# Patient Record
Sex: FEMALE | Race: ASIAN | NOT HISPANIC OR LATINO | URBAN - METROPOLITAN AREA
[De-identification: names, ages, dates, MRNs, and addresses within clinical notes are randomized per-mention and may not be internally consistent; named-entity substitution may affect disease eponyms.]

---

## 2017-09-28 ENCOUNTER — INPATIENT (INPATIENT)
Facility: HOSPITAL | Age: 22
LOS: 3 days | Discharge: HOME | End: 2017-10-02
Attending: SPECIALIST | Admitting: SPECIALIST

## 2017-09-28 VITALS — WEIGHT: 154.32 LBS | HEIGHT: 60 IN

## 2017-09-28 DIAGNOSIS — O26.899 OTHER SPECIFIED PREGNANCY RELATED CONDITIONS, UNSPECIFIED TRIMESTER: ICD-10-CM

## 2017-09-28 DIAGNOSIS — Z3A.00 WEEKS OF GESTATION OF PREGNANCY NOT SPECIFIED: ICD-10-CM

## 2017-09-28 LAB
BASOPHILS # BLD AUTO: 0.05 K/UL — SIGNIFICANT CHANGE UP (ref 0–0.2)
BASOPHILS NFR BLD AUTO: 0.4 % — SIGNIFICANT CHANGE UP (ref 0–2)
BLD GP AB SCN SERPL QL: NEGATIVE — SIGNIFICANT CHANGE UP
EOSINOPHIL # BLD AUTO: 0.07 K/UL — SIGNIFICANT CHANGE UP (ref 0–0.5)
EOSINOPHIL NFR BLD AUTO: 0.5 % — SIGNIFICANT CHANGE UP (ref 0–6)
HCT VFR BLD CALC: 37.7 % — SIGNIFICANT CHANGE UP (ref 34.5–45)
HGB BLD-MCNC: 12 G/DL — SIGNIFICANT CHANGE UP (ref 11.5–15.5)
IMM GRANULOCYTES # BLD AUTO: 0.24 # — SIGNIFICANT CHANGE UP
IMM GRANULOCYTES NFR BLD AUTO: 1.8 % — HIGH (ref 0–1.5)
LYMPHOCYTES # BLD AUTO: 2.73 K/UL — SIGNIFICANT CHANGE UP (ref 1–3.3)
LYMPHOCYTES # BLD AUTO: 20.7 % — SIGNIFICANT CHANGE UP (ref 13–44)
MCHC RBC-ENTMCNC: 24.8 PG — LOW (ref 27–34)
MCHC RBC-ENTMCNC: 31.8 % — LOW (ref 32–36)
MCV RBC AUTO: 77.9 FL — LOW (ref 80–100)
MONOCYTES # BLD AUTO: 0.85 K/UL — SIGNIFICANT CHANGE UP (ref 0–0.9)
MONOCYTES NFR BLD AUTO: 6.4 % — SIGNIFICANT CHANGE UP (ref 2–14)
NEUTROPHILS # BLD AUTO: 9.27 K/UL — HIGH (ref 1.8–7.4)
NEUTROPHILS NFR BLD AUTO: 70.2 % — SIGNIFICANT CHANGE UP (ref 43–77)
NRBC # FLD: 0 — SIGNIFICANT CHANGE UP
PLATELET # BLD AUTO: 162 K/UL — SIGNIFICANT CHANGE UP (ref 150–400)
PMV BLD: 12.9 FL — SIGNIFICANT CHANGE UP (ref 7–13)
RBC # BLD: 4.84 M/UL — SIGNIFICANT CHANGE UP (ref 3.8–5.2)
RBC # FLD: 14.8 % — HIGH (ref 10.3–14.5)
RH IG SCN BLD-IMP: POSITIVE — SIGNIFICANT CHANGE UP
RH IG SCN BLD-IMP: POSITIVE — SIGNIFICANT CHANGE UP
WBC # BLD: 13.21 K/UL — HIGH (ref 3.8–10.5)
WBC # FLD AUTO: 13.21 K/UL — HIGH (ref 3.8–10.5)

## 2017-09-28 RX ORDER — AMPICILLIN TRIHYDRATE 250 MG
1 CAPSULE ORAL EVERY 4 HOURS
Qty: 0 | Refills: 0 | Status: DISCONTINUED | OUTPATIENT
Start: 2017-09-28 | End: 2017-09-29

## 2017-09-28 RX ORDER — CITRIC ACID/SODIUM CITRATE 300-500 MG
15 SOLUTION, ORAL ORAL EVERY 4 HOURS
Qty: 0 | Refills: 0 | Status: DISCONTINUED | OUTPATIENT
Start: 2017-09-28 | End: 2017-09-29

## 2017-09-28 RX ORDER — SODIUM CHLORIDE 9 MG/ML
1000 INJECTION, SOLUTION INTRAVENOUS
Qty: 0 | Refills: 0 | Status: DISCONTINUED | OUTPATIENT
Start: 2017-09-28 | End: 2017-09-29

## 2017-09-28 RX ORDER — SODIUM CHLORIDE 9 MG/ML
1000 INJECTION, SOLUTION INTRAVENOUS ONCE
Qty: 0 | Refills: 0 | Status: DISCONTINUED | OUTPATIENT
Start: 2017-09-28 | End: 2017-09-29

## 2017-09-28 RX ORDER — AMPICILLIN TRIHYDRATE 250 MG
CAPSULE ORAL
Qty: 0 | Refills: 0 | Status: DISCONTINUED | OUTPATIENT
Start: 2017-09-28 | End: 2017-09-29

## 2017-09-28 RX ORDER — AMPICILLIN TRIHYDRATE 250 MG
2 CAPSULE ORAL ONCE
Qty: 0 | Refills: 0 | Status: COMPLETED | OUTPATIENT
Start: 2017-09-28 | End: 2017-09-28

## 2017-09-28 RX ORDER — OXYTOCIN 10 UNIT/ML
333.33 VIAL (ML) INJECTION
Qty: 20 | Refills: 0 | Status: DISCONTINUED | OUTPATIENT
Start: 2017-09-28 | End: 2017-09-29

## 2017-09-28 RX ADMIN — Medication 216 GRAM(S): at 20:14

## 2017-09-28 RX ADMIN — SODIUM CHLORIDE 125 MILLILITER(S): 9 INJECTION, SOLUTION INTRAVENOUS at 20:14

## 2017-09-29 DIAGNOSIS — O26.899 OTHER SPECIFIED PREGNANCY RELATED CONDITIONS, UNSPECIFIED TRIMESTER: ICD-10-CM

## 2017-09-29 LAB — T PALLIDUM AB TITR SER: NEGATIVE — SIGNIFICANT CHANGE UP

## 2017-09-29 RX ORDER — CEFAZOLIN SODIUM 1 G
2000 VIAL (EA) INJECTION EVERY 8 HOURS
Qty: 0 | Refills: 0 | Status: DISCONTINUED | OUTPATIENT
Start: 2017-09-30 | End: 2017-10-01

## 2017-09-29 RX ORDER — NALOXONE HYDROCHLORIDE 4 MG/.1ML
0.1 SPRAY NASAL
Qty: 0 | Refills: 0 | Status: DISCONTINUED | OUTPATIENT
Start: 2017-09-30 | End: 2017-10-01

## 2017-09-29 RX ORDER — TETANUS TOXOID, REDUCED DIPHTHERIA TOXOID AND ACELLULAR PERTUSSIS VACCINE, ADSORBED 5; 2.5; 8; 8; 2.5 [IU]/.5ML; [IU]/.5ML; UG/.5ML; UG/.5ML; UG/.5ML
0.5 SUSPENSION INTRAMUSCULAR ONCE
Qty: 0 | Refills: 0 | Status: DISCONTINUED | OUTPATIENT
Start: 2017-09-30 | End: 2017-10-02

## 2017-09-29 RX ORDER — CEFAZOLIN SODIUM 1 G
VIAL (EA) INJECTION
Qty: 0 | Refills: 0 | Status: DISCONTINUED | OUTPATIENT
Start: 2017-09-29 | End: 2017-09-29

## 2017-09-29 RX ORDER — DOCUSATE SODIUM 100 MG
100 CAPSULE ORAL
Qty: 0 | Refills: 0 | Status: DISCONTINUED | OUTPATIENT
Start: 2017-09-30 | End: 2017-10-02

## 2017-09-29 RX ORDER — HYDROMORPHONE HYDROCHLORIDE 2 MG/ML
1 INJECTION INTRAMUSCULAR; INTRAVENOUS; SUBCUTANEOUS
Qty: 0 | Refills: 0 | Status: DISCONTINUED | OUTPATIENT
Start: 2017-09-30 | End: 2017-10-01

## 2017-09-29 RX ORDER — GLYCERIN ADULT
1 SUPPOSITORY, RECTAL RECTAL AT BEDTIME
Qty: 0 | Refills: 0 | Status: DISCONTINUED | OUTPATIENT
Start: 2017-09-30 | End: 2017-10-02

## 2017-09-29 RX ORDER — HEPARIN SODIUM 5000 [USP'U]/ML
5000 INJECTION INTRAVENOUS; SUBCUTANEOUS EVERY 12 HOURS
Qty: 0 | Refills: 0 | Status: DISCONTINUED | OUTPATIENT
Start: 2017-09-30 | End: 2017-10-02

## 2017-09-29 RX ORDER — BUTORPHANOL TARTRATE 2 MG/ML
2 INJECTION, SOLUTION INTRAMUSCULAR; INTRAVENOUS ONCE
Qty: 0 | Refills: 0 | Status: DISCONTINUED | OUTPATIENT
Start: 2017-09-29 | End: 2017-09-29

## 2017-09-29 RX ORDER — DIPHENHYDRAMINE HCL 50 MG
25 CAPSULE ORAL EVERY 6 HOURS
Qty: 0 | Refills: 0 | Status: DISCONTINUED | OUTPATIENT
Start: 2017-09-30 | End: 2017-10-02

## 2017-09-29 RX ORDER — OXYTOCIN 10 UNIT/ML
41.67 VIAL (ML) INJECTION
Qty: 20 | Refills: 0 | Status: DISCONTINUED | OUTPATIENT
Start: 2017-09-29 | End: 2017-09-30

## 2017-09-29 RX ORDER — SODIUM CHLORIDE 9 MG/ML
1000 INJECTION, SOLUTION INTRAVENOUS
Qty: 0 | Refills: 0 | Status: DISCONTINUED | OUTPATIENT
Start: 2017-09-30 | End: 2017-10-01

## 2017-09-29 RX ORDER — SIMETHICONE 80 MG/1
80 TABLET, CHEWABLE ORAL EVERY 4 HOURS
Qty: 0 | Refills: 0 | Status: DISCONTINUED | OUTPATIENT
Start: 2017-09-30 | End: 2017-10-02

## 2017-09-29 RX ORDER — FERROUS SULFATE 325(65) MG
325 TABLET ORAL DAILY
Qty: 0 | Refills: 0 | Status: DISCONTINUED | OUTPATIENT
Start: 2017-09-30 | End: 2017-10-02

## 2017-09-29 RX ORDER — OXYCODONE HYDROCHLORIDE 5 MG/1
5 TABLET ORAL
Qty: 0 | Refills: 0 | Status: DISCONTINUED | OUTPATIENT
Start: 2017-09-29 | End: 2017-10-01

## 2017-09-29 RX ORDER — LANOLIN
1 OINTMENT (GRAM) TOPICAL
Qty: 0 | Refills: 0 | Status: DISCONTINUED | OUTPATIENT
Start: 2017-09-30 | End: 2017-10-02

## 2017-09-29 RX ORDER — ONDANSETRON 8 MG/1
4 TABLET, FILM COATED ORAL EVERY 6 HOURS
Qty: 0 | Refills: 0 | Status: DISCONTINUED | OUTPATIENT
Start: 2017-09-30 | End: 2017-10-01

## 2017-09-29 RX ORDER — BUTORPHANOL TARTRATE 2 MG/ML
0.12 INJECTION, SOLUTION INTRAMUSCULAR; INTRAVENOUS EVERY 6 HOURS
Qty: 0 | Refills: 0 | Status: DISCONTINUED | OUTPATIENT
Start: 2017-09-30 | End: 2017-10-01

## 2017-09-29 RX ORDER — CEFAZOLIN SODIUM 1 G
2000 VIAL (EA) INJECTION ONCE
Qty: 0 | Refills: 0 | Status: COMPLETED | OUTPATIENT
Start: 2017-09-29 | End: 2017-09-29

## 2017-09-29 RX ORDER — CEFAZOLIN SODIUM 1 G
VIAL (EA) INJECTION
Qty: 0 | Refills: 0 | Status: DISCONTINUED | OUTPATIENT
Start: 2017-09-29 | End: 2017-10-01

## 2017-09-29 RX ORDER — OXYCODONE HYDROCHLORIDE 5 MG/1
10 TABLET ORAL
Qty: 0 | Refills: 0 | Status: DISCONTINUED | OUTPATIENT
Start: 2017-09-30 | End: 2017-10-01

## 2017-09-29 RX ADMIN — BUTORPHANOL TARTRATE 2 MILLIGRAM(S): 2 INJECTION, SOLUTION INTRAMUSCULAR; INTRAVENOUS at 10:57

## 2017-09-29 RX ADMIN — BUTORPHANOL TARTRATE 2 MILLIGRAM(S): 2 INJECTION, SOLUTION INTRAMUSCULAR; INTRAVENOUS at 14:31

## 2017-09-29 RX ADMIN — Medication 108 GRAM(S): at 00:07

## 2017-09-29 RX ADMIN — Medication 125 MILLIUNIT(S)/MIN: at 19:11

## 2017-09-29 RX ADMIN — Medication 108 GRAM(S): at 12:48

## 2017-09-29 RX ADMIN — BUTORPHANOL TARTRATE 2 MILLIGRAM(S): 2 INJECTION, SOLUTION INTRAMUSCULAR; INTRAVENOUS at 09:54

## 2017-09-29 RX ADMIN — Medication 108 GRAM(S): at 08:51

## 2017-09-29 RX ADMIN — Medication 204 MILLIGRAM(S): at 14:31

## 2017-09-29 RX ADMIN — Medication 108 GRAM(S): at 04:16

## 2017-09-29 RX ADMIN — Medication 204 MILLIGRAM(S): at 09:54

## 2017-09-29 RX ADMIN — OXYCODONE HYDROCHLORIDE 5 MILLIGRAM(S): 5 TABLET ORAL at 23:04

## 2017-09-29 RX ADMIN — Medication 100 MILLIGRAM(S): at 23:04

## 2017-09-29 NOTE — PROVIDER CONTACT NOTE (OTHER) - SITUATION
Patient has had multiple severe BP, 164/144 HR 96, 156/120 HR97. Patient denies HA, dizziness, NV, epigastric pain, blurry vision. pt only complains of neck pain radiating to shoulder 6/10.

## 2017-09-29 NOTE — PROVIDER CONTACT NOTE (OTHER) - ASSESSMENT
Patient has had multiple severe BP, 164/144 HR 96, 156/120 HR97. Patient denies HA, dizziness, NV, epigastric pain, blurry vision. pt only complains of neck pain radiating to shoulder 6/10. Patient declines any pain meds. Pt fundus firm, at umbilicus bleeding lightly. Patient is shivering.

## 2017-09-30 LAB
HCT VFR BLD CALC: 30.9 % — LOW (ref 34.5–45)
HGB BLD-MCNC: 10 G/DL — LOW (ref 11.5–15.5)
MCHC RBC-ENTMCNC: 25.8 PG — LOW (ref 27–34)
MCHC RBC-ENTMCNC: 32.4 % — SIGNIFICANT CHANGE UP (ref 32–36)
MCV RBC AUTO: 79.6 FL — LOW (ref 80–100)
NRBC # FLD: 0 — SIGNIFICANT CHANGE UP
PLATELET # BLD AUTO: 126 K/UL — LOW (ref 150–400)
PMV BLD: 12.6 FL — SIGNIFICANT CHANGE UP (ref 7–13)
RBC # BLD: 3.88 M/UL — SIGNIFICANT CHANGE UP (ref 3.8–5.2)
RBC # FLD: 15.1 % — HIGH (ref 10.3–14.5)
WBC # BLD: 14.89 K/UL — HIGH (ref 3.8–10.5)
WBC # FLD AUTO: 14.89 K/UL — HIGH (ref 3.8–10.5)

## 2017-09-30 RX ORDER — OXYTOCIN 10 UNIT/ML
41.67 VIAL (ML) INJECTION
Qty: 20 | Refills: 0 | Status: DISCONTINUED | OUTPATIENT
Start: 2017-09-30 | End: 2017-09-30

## 2017-09-30 RX ORDER — OXYTOCIN 10 UNIT/ML
333.33 VIAL (ML) INJECTION
Qty: 20 | Refills: 0 | Status: DISCONTINUED | OUTPATIENT
Start: 2017-09-30 | End: 2017-09-30

## 2017-09-30 RX ORDER — OXYCODONE HYDROCHLORIDE 5 MG/1
5 TABLET ORAL EVERY 4 HOURS
Qty: 0 | Refills: 0 | Status: DISCONTINUED | OUTPATIENT
Start: 2017-10-01 | End: 2017-10-02

## 2017-09-30 RX ORDER — IBUPROFEN 200 MG
600 TABLET ORAL EVERY 6 HOURS
Qty: 0 | Refills: 0 | Status: DISCONTINUED | OUTPATIENT
Start: 2017-10-01 | End: 2017-10-02

## 2017-09-30 RX ORDER — SODIUM CHLORIDE 9 MG/ML
1000 INJECTION, SOLUTION INTRAVENOUS
Qty: 0 | Refills: 0 | Status: DISCONTINUED | OUTPATIENT
Start: 2017-09-30 | End: 2017-09-30

## 2017-09-30 RX ORDER — ACETAMINOPHEN 500 MG
975 TABLET ORAL EVERY 6 HOURS
Qty: 0 | Refills: 0 | Status: DISCONTINUED | OUTPATIENT
Start: 2017-09-30 | End: 2017-10-02

## 2017-09-30 RX ORDER — HYDROMORPHONE HYDROCHLORIDE 2 MG/ML
0.5 INJECTION INTRAMUSCULAR; INTRAVENOUS; SUBCUTANEOUS
Qty: 0 | Refills: 0 | Status: DISCONTINUED | OUTPATIENT
Start: 2017-09-30 | End: 2017-09-30

## 2017-09-30 RX ORDER — KETOROLAC TROMETHAMINE 30 MG/ML
30 SYRINGE (ML) INJECTION EVERY 6 HOURS
Qty: 0 | Refills: 0 | Status: DISCONTINUED | OUTPATIENT
Start: 2017-09-30 | End: 2017-10-01

## 2017-09-30 RX ORDER — HYDROMORPHONE HYDROCHLORIDE 2 MG/ML
1 INJECTION INTRAMUSCULAR; INTRAVENOUS; SUBCUTANEOUS
Qty: 0 | Refills: 0 | Status: DISCONTINUED | OUTPATIENT
Start: 2017-09-30 | End: 2017-09-30

## 2017-09-30 RX ORDER — ONDANSETRON 8 MG/1
4 TABLET, FILM COATED ORAL ONCE
Qty: 0 | Refills: 0 | Status: DISCONTINUED | OUTPATIENT
Start: 2017-09-30 | End: 2017-09-30

## 2017-09-30 RX ORDER — OXYCODONE HYDROCHLORIDE 5 MG/1
5 TABLET ORAL
Qty: 0 | Refills: 0 | Status: DISCONTINUED | OUTPATIENT
Start: 2017-10-01 | End: 2017-10-02

## 2017-09-30 RX ADMIN — Medication 30 MILLIGRAM(S): at 09:15

## 2017-09-30 RX ADMIN — Medication 30 MILLIGRAM(S): at 00:45

## 2017-09-30 RX ADMIN — Medication 100 MILLIGRAM(S): at 05:28

## 2017-09-30 RX ADMIN — HEPARIN SODIUM 5000 UNIT(S): 5000 INJECTION INTRAVENOUS; SUBCUTANEOUS at 00:45

## 2017-09-30 RX ADMIN — Medication 975 MILLIGRAM(S): at 20:00

## 2017-09-30 RX ADMIN — Medication 30 MILLIGRAM(S): at 09:00

## 2017-09-30 RX ADMIN — Medication 975 MILLIGRAM(S): at 01:15

## 2017-09-30 RX ADMIN — Medication 100 MILLIGRAM(S): at 13:15

## 2017-09-30 RX ADMIN — Medication 975 MILLIGRAM(S): at 20:30

## 2017-09-30 RX ADMIN — Medication 30 MILLIGRAM(S): at 17:00

## 2017-09-30 RX ADMIN — HEPARIN SODIUM 5000 UNIT(S): 5000 INJECTION INTRAVENOUS; SUBCUTANEOUS at 13:30

## 2017-09-30 RX ADMIN — Medication 30 MILLIGRAM(S): at 01:15

## 2017-09-30 RX ADMIN — OXYCODONE HYDROCHLORIDE 5 MILLIGRAM(S): 5 TABLET ORAL at 00:00

## 2017-09-30 RX ADMIN — Medication 975 MILLIGRAM(S): at 00:45

## 2017-09-30 RX ADMIN — Medication 25 MILLIGRAM(S): at 02:48

## 2017-09-30 RX ADMIN — Medication 30 MILLIGRAM(S): at 17:15

## 2017-09-30 NOTE — DISCHARGE NOTE OB - PATIENT PORTAL LINK FT
“You can access the [employer] Patient Portal, offered by [facility], by registering with the following website: [email or web address]”

## 2017-09-30 NOTE — DISCHARGE NOTE OB - CARE PLAN
Principal Discharge DX:	 delivery delivered  Goal:	routine postop care  Instructions for follow-up, activity and diet:	follow up in 2 weeks/ regular diet & activity as tolerate

## 2017-09-30 NOTE — DISCHARGE NOTE OB - MATERIALS PROVIDED
Back To Sleep Handout/Shaken Baby Prevention Handout/Discharge Medication Information for Patients and Families Pocket Guide/[facility] [place] Screening Program/[place]  Immunization Record/Bottle Feeding Log/Breastfeeding Log/[facility] Hearing Screen Program/Vaccinations/Breastfeeding Mother’s Support Group Information/Guide to Postpartum Care/Breastfeeding Guide and Packet/Birth Certificate Instructions

## 2017-09-30 NOTE — PROGRESS NOTE ADULT - SUBJECTIVE AND OBJECTIVE BOX
Postpartum Note,  Section  She is a  22y woman who is now post-operative day: 1    Subjective:  The patient feels well.  She is ambulating.   She is tolerating regular diet.  She denies nausea and vomiting.  She is voiding.  Her pain is controlled.  She reports normal postpartum bleeding.    Vital Signs Last 24 Hrs  T(C): 37.1 (30 Sep 2017 05:34), Max: 38.1 (30 Sep 2017 00:02)  T(F): 98.7 (30 Sep 2017 05:34), Max: 100.6 (30 Sep 2017 00:02)  HR: 88 (30 Sep 2017 07:20) (86 - 107)  BP: 128/83 (30 Sep 2017 05:34) (84/38 - 167/125)  BP(mean): 71 (29 Sep 2017 22:30) (71 - 74)  RR: 17 (30 Sep 2017 05:34) (16 - 18)  SpO2: 100% (30 Sep 2017 05:34) (97% - 999%)    Physical exam:  Gen: NAD  Breast: Soft, nontender, not engorged.  Abdomen: Soft, nontender, no distension , firm uterine fundus at umbilicus.  Incision: Clean, dry, and intact with steri strips  Pelvic: Normal lochia noted  Ext: No calf tenderness    LABS:                        10.0   14.89 )-----------( 126      ( 30 Sep 2017 05:30 )             30.9         Allergies    No Known Allergies    Intolerances      MEDICATIONS  (STANDING):  heparin  Injectable 5000 Unit(s) SubCutaneous every 12 hours  lactated ringers. 1000 milliLiter(s) (125 mL/Hr) IV Continuous <Continuous>  diphtheria/tetanus/pertussis (acellular) Vaccine (ADAcel) 0.5 milliLiter(s) IntraMuscular once  ferrous    sulfate 325 milliGRAM(s) Oral daily  prenatal multivitamin 1 Tablet(s) Oral daily  ceFAZolin   IVPB      ceFAZolin   IVPB 2000 milliGRAM(s) IV Intermittent every 8 hours  ketorolac   Injectable 30 milliGRAM(s) IV Push every 6 hours  acetaminophen   Tablet. 975 milliGRAM(s) Oral every 6 hours    MEDICATIONS  (PRN):  oxyCODONE    IR 5 milliGRAM(s) Oral every 3 hours PRN Mild Pain  oxyCODONE    IR 10 milliGRAM(s) Oral every 3 hours PRN Moderate Pain  HYDROmorphone  Injectable 1 milliGRAM(s) IV Push every 3 hours PRN Severe Pain  naloxone Injectable 0.1 milliGRAM(s) IV Push every 3 minutes PRN For ANY of the following changes in patient status:  A. RR LESS THAN 10 breaths per minute, B. Oxygen saturation LESS THAN 90%, C. Sedation score of 6  ondansetron Injectable 4 milliGRAM(s) IV Push every 6 hours PRN Nausea  butorphanol Injectable 0.125 milliGRAM(s) IV Push every 6 hours PRN Pruritus  simethicone 80 milliGRAM(s) Chew every 4 hours PRN Gas  diphenhydrAMINE   Capsule 25 milliGRAM(s) Oral every 6 hours PRN Itching  glycerin Suppository - Adult 1 Suppository(s) Rectal at bedtime PRN Constipation  docusate sodium 100 milliGRAM(s) Oral two times a day PRN Stool Softening  lanolin Ointment 1 Application(s) Topical every 3 hours PRN Sore Nipples        Assessment and Plan  POD # 1s/p  section  Doing well.  Encourage ambulation.

## 2017-09-30 NOTE — PROGRESS NOTE ADULT - SUBJECTIVE AND OBJECTIVE BOX
OB Progress Note:  Delivery, POD#1    S: 23yo POD#1 s/p LTCS . Her pain is well controlled. She is tolerating a regular diet and passing flatus. Denies N/V. Denies CP/SOB/lightheadedness/dizziness. She is ambulating without difficulty,  Indwelling catheter is in place.    O:   Vital Signs Last 24 Hrs  T(C): 37.1 (30 Sep 2017 05:34), Max: 38.1 (30 Sep 2017 00:02)  T(F): 98.7 (30 Sep 2017 05:34), Max: 100.6 (30 Sep 2017 00:02)  HR: 88 (30 Sep 2017 07:20) (86 - 107)  BP: 128/83 (30 Sep 2017 05:34) (84/38 - 167/125)  BP(mean): 71 (29 Sep 2017 22:30) (71 - 74)  RR: 17 (30 Sep 2017 05:34) (16 - 18)  SpO2: 100% (30 Sep 2017 05:34) (97% - 999%)    Labs:  Blood type: B Positive  Rubella IgG: RPR: Negative                          10.0<L>   14.89<H> >-----------< 126<L>    (  @ 05:30 )             30.9<L>                        12.0   13.21<H> >-----------< 162    (  @ 19:35 )             37.7      PE:  General: NAD  Abdomen: Mildly distended, appropriately tender, incision c/d/i.  Extremities: No erythema, no pitting edema

## 2017-09-30 NOTE — DISCHARGE NOTE OB - MEDICATION SUMMARY - MEDICATIONS TO TAKE
I will START or STAY ON the medications listed below when I get home from the hospital:    ibuprofen 600 mg oral tablet  -- 1 tab(s) by mouth every 6 hours  -- Indication: For for pain     oxyCODONE 5 mg oral tablet  -- 1 tab(s) by mouth every 4 hours, As needed, Severe Pain (7 - 10)  -- Indication: For for moderate pain

## 2017-09-30 NOTE — PROGRESS NOTE ADULT - PROBLEM SELECTOR PLAN 1
- Continue regular diet.  - Increase ambulation.  - Continue motrin, tylenol, oxycodone PRN for pain control.  - Discontinue salmeron catheter at 24 hours post-op   - F/u AM CBC    Donovan Anglin PGY-1

## 2017-09-30 NOTE — CHART NOTE - NSCHARTNOTEFT_GEN_A_CORE
Called by RN due to patient having temperature of 100.6 F on admission.  Patient currently on Invanz X 3 doses for prophalaxis.      S: Patient doing well.   Pain controlled.   Patient has not started to ambulating.  Patient is breastfeeding and formula feeding.  Passing flatus, no bowel movement.  Patient reports moderate lochia.      O: Vital Signs Last 24 Hrs  T(C): 38.1 (30 Sep 2017 00:02), Max: 38.1 (30 Sep 2017 00:02)  T(F): 100.6 (30 Sep 2017 00:02), Max: 100.6 (30 Sep 2017 00:02)  HR: 87 (30 Sep 2017 00:02) (87 - 107)  BP: 121/58 (30 Sep 2017 00:02) (84/38 - 167/125)  BP(mean): 71 (29 Sep 2017 22:30) (71 - 74)  RR: 18 (30 Sep 2017 00:02) (16 - 18)  SpO2: 98% (30 Sep 2017 00:02) (98% - 999%)    Gen: NAD  Breast: soft, non-tender  Abd: soft, appropriately tender, ND, fundus firm below umbilicus  Lochia: moderate  Incision: well approximated, clean, dry, intact, derma-bond  - salmeron intact, draining clear yellow urine.  Ext: no tenderness, edema present bilaterally    Labs:                        12.0   13.21 )-----------( 162      ( 28 Sep 2017 19:35 )             37.7       A: 22y POD#1 s/p C/S with temperature of 100.6 F.     Plan: As discussed with Dr. Gutierrez PGY-4, Tylenol 975mg for fever.  Continue Invanz for 24 hours.  No further workup at this time.  Continue routine postpartum care  Will continue to monitor.

## 2017-09-30 NOTE — DISCHARGE NOTE OB - CARE PROVIDER_API CALL
Sabino Granger), Obstetrics and Gynecology  [address] [place] [address]  Phone: [phone]  Fax: [phone]

## 2017-10-01 RX ORDER — IBUPROFEN 200 MG
1 TABLET ORAL
Qty: 0 | Refills: 0 | COMMUNITY
Start: 2017-10-01

## 2017-10-01 RX ORDER — OXYCODONE HYDROCHLORIDE 5 MG/1
1 TABLET ORAL
Qty: 0 | Refills: 0 | COMMUNITY
Start: 2017-10-01

## 2017-10-01 RX ADMIN — SIMETHICONE 80 MILLIGRAM(S): 80 TABLET, CHEWABLE ORAL at 00:30

## 2017-10-01 RX ADMIN — Medication 975 MILLIGRAM(S): at 17:00

## 2017-10-01 RX ADMIN — Medication 975 MILLIGRAM(S): at 16:30

## 2017-10-01 RX ADMIN — HEPARIN SODIUM 5000 UNIT(S): 5000 INJECTION INTRAVENOUS; SUBCUTANEOUS at 23:59

## 2017-10-01 RX ADMIN — HEPARIN SODIUM 5000 UNIT(S): 5000 INJECTION INTRAVENOUS; SUBCUTANEOUS at 00:29

## 2017-10-01 RX ADMIN — Medication 600 MILLIGRAM(S): at 00:30

## 2017-10-01 RX ADMIN — Medication 600 MILLIGRAM(S): at 06:45

## 2017-10-01 RX ADMIN — Medication 975 MILLIGRAM(S): at 22:16

## 2017-10-01 RX ADMIN — Medication 975 MILLIGRAM(S): at 06:45

## 2017-10-01 RX ADMIN — Medication 975 MILLIGRAM(S): at 06:09

## 2017-10-01 RX ADMIN — Medication 600 MILLIGRAM(S): at 06:10

## 2017-10-01 RX ADMIN — Medication 975 MILLIGRAM(S): at 23:00

## 2017-10-01 RX ADMIN — HEPARIN SODIUM 5000 UNIT(S): 5000 INJECTION INTRAVENOUS; SUBCUTANEOUS at 12:00

## 2017-10-01 RX ADMIN — Medication 600 MILLIGRAM(S): at 01:00

## 2017-10-01 NOTE — PROGRESS NOTE ADULT - SUBJECTIVE AND OBJECTIVE BOX
S: Patient doing well.  Complains of neck and back pain.    Pain controlled with meds.  Patient is ambulating.  Patient is breastfeeding and formula feeding.  Voiding without difficulty.  Passing flatus, no bowel movement.  Patient reports moderate lochia.      O: Vital Signs Last 24 Hrs  T(C): 36.7 (01 Oct 2017 05:50), Max: 37 (30 Sep 2017 14:13)  T(F): 98 (01 Oct 2017 05:50), Max: 98.6 (30 Sep 2017 14:13)  HR: 77 (01 Oct 2017 05:50) (73 - 94)  BP: 111/68 (01 Oct 2017 05:50) (107/64 - 112/75)  BP(mean): --  RR: 18 (01 Oct 2017 05:50) (16 - 18)  SpO2: 100% (01 Oct 2017 05:50) (100% - 100%)    Gen: NAD  Breast: soft, non-tender  Abd: soft, NT, ND, fundus firm below umbilicus  Lochia: moderate  Incision: well approximated, clean, dry, intact dermabond  Ext: no tenderness, edema present bilaterally, complaints of tightness in lower extremities due to edema.    Labs:                        10.0   14.89 )-----------( 126      ( 30 Sep 2017 05:30 )             30.9       A: 22y POD#2 s/p C/S doing well.     Plan: Continue routine postpartum care. Provided with heat packs to back and neck.  Continue with analgesics: oxycodone/motrin/tylenol.

## 2017-10-01 NOTE — PROGRESS NOTE ADULT - SUBJECTIVE AND OBJECTIVE BOX
Postpartum Note,  Section  She is a  22y woman who is now post-operative day: 2    Subjective:  The patient feels well.  She is ambulating.   She is tolerating regular diet.  She denies nausea and vomiting.  She is voiding.  Her pain is controlled.  She reports normal postpartum bleeding.    Vital Signs Last 24 Hrs  T(C): 36.7 (01 Oct 2017 05:50), Max: 37 (30 Sep 2017 14:13)  T(F): 98 (01 Oct 2017 05:50), Max: 98.6 (30 Sep 2017 14:13)  HR: 77 (01 Oct 2017 05:50) (73 - 94)  BP: 111/68 (01 Oct 2017 05:50) (107/64 - 112/75)  BP(mean): --  RR: 18 (01 Oct 2017 05:50) (16 - 18)  SpO2: 100% (01 Oct 2017 05:50) (100% - 100%)    Physical exam:  Gen: NAD  Breast: Soft, nontender, not engorged.  Abdomen: Soft, nontender, no distension , firm uterine fundus at umbilicus.  Incision: Clean, dry, and intact with steri strips  Pelvic: Normal lochia noted  Ext: No calf tenderness    LABS:                        10.0   14.89 )-----------( 126      ( 30 Sep 2017 05:30 )             30.9         Allergies    No Known Allergies    Intolerances      MEDICATIONS  (STANDING):  heparin  Injectable 5000 Unit(s) SubCutaneous every 12 hours  diphtheria/tetanus/pertussis (acellular) Vaccine (ADAcel) 0.5 milliLiter(s) IntraMuscular once  ferrous    sulfate 325 milliGRAM(s) Oral daily  prenatal multivitamin 1 Tablet(s) Oral daily  acetaminophen   Tablet. 975 milliGRAM(s) Oral every 6 hours  ibuprofen  Tablet 600 milliGRAM(s) Oral every 6 hours  oxyCODONE    IR 5 milliGRAM(s) Oral every 3 hours    MEDICATIONS  (PRN):  simethicone 80 milliGRAM(s) Chew every 4 hours PRN Gas  diphenhydrAMINE   Capsule 25 milliGRAM(s) Oral every 6 hours PRN Itching  glycerin Suppository - Adult 1 Suppository(s) Rectal at bedtime PRN Constipation  docusate sodium 100 milliGRAM(s) Oral two times a day PRN Stool Softening  lanolin Ointment 1 Application(s) Topical every 3 hours PRN Sore Nipples  oxyCODONE    IR 5 milliGRAM(s) Oral every 4 hours PRN Severe Pain (7 - 10)        Assessment and Plan  POD #2 s/p  section  Doing well.  Encourage ambulation.

## 2017-10-02 VITALS
DIASTOLIC BLOOD PRESSURE: 64 MMHG | RESPIRATION RATE: 17 BRPM | OXYGEN SATURATION: 100 % | SYSTOLIC BLOOD PRESSURE: 119 MMHG | TEMPERATURE: 99 F | HEART RATE: 77 BPM

## 2017-10-02 RX ADMIN — Medication 975 MILLIGRAM(S): at 07:42

## 2017-10-02 RX ADMIN — Medication 975 MILLIGRAM(S): at 06:36

## 2017-10-02 NOTE — PROGRESS NOTE ADULT - SUBJECTIVE AND OBJECTIVE BOX
Patient assessed at 0858.  Subjective  Pain: Patient denies any pain at the time of assessment. Pain being managed well by pain management protocol.  Complaints: None. Patient reports shoulder pain related to gas discomfort and lower back pain.   Milestones:  Alert and orientedx3. Out of bed ambulating. Positive flatus. Negative bowel movement. Patient denies the urge to have a bowel movement.  Voiding.  Tolerating a regular diet.  Infant feeding: breastfeeding and formula feeding  Feeding related issues and/or concerns: none    Objective  Vital Signs:  Vital Signs Last 24 Hrs  T(C): 37.4 (02 Oct 2017 06:25), Max: 37.5 (01 Oct 2017 18:06)  T(F): 99.4 (02 Oct 2017 06:25), Max: 99.5 (01 Oct 2017 18:06)  HR: 77 (02 Oct 2017 06:25) (77 - 93)  BP: 119/64 (02 Oct 2017 06:25) (119/64 - 130/63)  BP(mean): --  RR: 17 (02 Oct 2017 06:25) (17 - 18)  SpO2: 100% (02 Oct 2017 06:25) (99% - 100%)    Labs:                10  14.89 )-----------( 126                  (30 Sep 2017 05:30)                30.9  Blood Type: Bpositive  Rubella: Immune  RPR: nonreactive    Assessment  23y/o     Day#3    primary post-operative  section delivery for abnormal fetal status  Condition: Stable  Past Medical History significant for: hernia repair 2000  Current Issues: none  Lung sounds clear bilaterally  Breasts: soft, nontender  Nipples: intact  Abdomen: Soft, nondistended and nontender. Bowel sounds present. Fundus firm  Abdominal incision: Clean, dry and intact with dermabond. Patient given abdominal binder for support. Patient states improvement of lower back pain.  Vaginal: Lochia light rubra  Extremities: Moderate edema noted bilaterally to lower extremities, negative Mc's Sign, nontender. Positive pedal pulses  Other relevant physical exam findings: none    Plan  Plan: Increase ambulation, analgesia PRN and pain medication protocol standing oxycodone, ibuprofen and acetaminophen.  Diet: Regular diet  Orders:    Continue routine post-operative and postpartum care. Patient to be discharge to home today. Discussed discharge planning. Patient assessed at 0858.  Subjective  Pain: Patient denies any pain at the time of assessment. Pain being managed well by pain management protocol.  Complaints: None. Patient reports shoulder pain related to gas discomfort and lower back pain.   Milestones:  Alert and orientedx3. Out of bed ambulating. Positive flatus. Negative bowel movement. Patient denies the urge to have a bowel movement.  Voiding.  Tolerating a regular diet.  Infant feeding: breastfeeding and formula feeding  Feeding related issues and/or concerns: none    Objective  Vital Signs:  Vital Signs Last 24 Hrs  T(C): 37.4 (02 Oct 2017 06:25), Max: 37.5 (01 Oct 2017 18:06)  T(F): 99.4 (02 Oct 2017 06:25), Max: 99.5 (01 Oct 2017 18:06)  HR: 77 (02 Oct 2017 06:25) (77 - 93)  BP: 119/64 (02 Oct 2017 06:25) (119/64 - 130/63)  BP(mean): --  RR: 17 (02 Oct 2017 06:25) (17 - 18)  SpO2: 100% (02 Oct 2017 06:25) (99% - 100%)    Labs:                10  14.89 )-----------( 126                  (30 Sep 2017 05:30)                30.9  Blood Type: Bpositive  Rubella: Immune  RPR: nonreactive    Medications  MEDICATIONS  (STANDING):  heparin  Injectable 5000 Unit(s) SubCutaneous every 12 hours  diphtheria/tetanus/pertussis (acellular) Vaccine (ADAcel) 0.5 milliLiter(s) IntraMuscular once  ferrous    sulfate 325 milliGRAM(s) Oral daily  prenatal multivitamin 1 Tablet(s) Oral daily  acetaminophen   Tablet. 975 milliGRAM(s) Oral every 6 hours  ibuprofen  Tablet 600 milliGRAM(s) Oral every 6 hours  oxyCODONE    IR 5 milliGRAM(s) Oral every 3 hours    MEDICATIONS  (PRN):  simethicone 80 milliGRAM(s) Chew every 4 hours PRN Gas  diphenhydrAMINE   Capsule 25 milliGRAM(s) Oral every 6 hours PRN Itching  glycerin Suppository - Adult 1 Suppository(s) Rectal at bedtime PRN Constipation  docusate sodium 100 milliGRAM(s) Oral two times a day PRN Stool Softening  lanolin Ointment 1 Application(s) Topical every 3 hours PRN Sore Nipples  oxyCODONE    IR 5 milliGRAM(s) Oral every 4 hours PRN Severe Pain (7 - 10)      Assessment  21y/o     Day#3    primary post-operative  section delivery for abnormal fetal status  Condition: Stable  Past Medical History significant for: hernia repair 2000  Current Issues: none  Lung sounds clear bilaterally  Breasts: soft, nontender  Nipples: intact  Abdomen: Soft, nondistended and nontender. Bowel sounds present. Fundus firm  Abdominal incision: Clean, dry and intact with dermabond. Patient given abdominal binder for support. Patient states improvement of lower back pain.  Vaginal: Lochia light rubra  Extremities: Moderate edema noted bilaterally to lower extremities, negative Mc's Sign, nontender. Positive pedal pulses  Other relevant physical exam findings: none    Plan  Plan: Increase ambulation, analgesia PRN and pain medication protocol standing oxycodone, ibuprofen and acetaminophen.  Diet: Regular diet  Orders:    Continue routine post-operative and postpartum care. Patient to be discharge to home today. Discussed discharge planning.
